# Patient Record
Sex: FEMALE | Race: WHITE | NOT HISPANIC OR LATINO | Employment: FULL TIME | URBAN - METROPOLITAN AREA
[De-identification: names, ages, dates, MRNs, and addresses within clinical notes are randomized per-mention and may not be internally consistent; named-entity substitution may affect disease eponyms.]

---

## 2018-01-09 NOTE — MISCELLANEOUS
Message   Recorded as Task   Date: 11/02/2016 01:41 PM, Created By: Merrianne Koyanagi   Task Name: Follow Up   Assigned To: Kimber Chery   Regarding Patient: Kobi Alvarez, Status: In Progress   Comment:    Mattie Bear - 02 Nov 2016 1:41 PM     TASK CREATED  L/m to return call  S/p C6-C7 ADAMS done 10/27/16 Mattie Pham - 02 Nov 2016 2:24 PM     TASK EDITED   Merrianne Koyanagi - 09 Nov 2016 1:59 PM     TASK EDITED     L/m to return call  S/p  C6-C7 ADAMS done 10/27/16 [facility] Mattie Pham - 10 Nov 2016 2:21 PM     TASK EDITED  3rd attempt  Mail letter  Letter Sent      Active Problems    1  Cervical disc disorder with radiculopathy of mid-cervical region (723 4) (M50 120)   2  Chronic neck pain (723 1,338 29) (M54 2,G89 29)   3  Chronic pain syndrome (338 4) (G89 4)   4  Lumbago (724 2) (M54 5)   5  Lumbar Strain (847 2)   6  Sacroiliitis (720 2) (M46 1)   7  Scapulothoracic Fibrositis On The Left (726 2)    Current Meds   1  Advil 200 MG Oral Capsule Recorded    Allergies    1   No Known Drug Allergies    Signatures   Electronically signed by : Kyla Mcardle, ; Nov 17 2016  9:43AM EST                       (Author)

## 2018-01-12 NOTE — MISCELLANEOUS
Signatures   Electronically signed by : IVETH Conn ; Nov 21 2016  8:19AM EST                       (Author)

## 2018-01-13 NOTE — MISCELLANEOUS
Message   Recorded as Task   Date: 09/29/2016 02:08 PM, Created By: Dahlia De La Garza   Task Name: Follow Up   Assigned To: [address] [facility], [address] Procedure,Team   Regarding Patient: Lorin Venegas, Status: In Progress   Comment:    Mattie Bear - 29 Sep 2016 2:08 PM     TASK CREATED  L/m to return call  S/p C6-C7 ADAMS done 9/21/16 by Mattie Panda - 29 Sep 2016 2:16 PM     TASK EDITED   Mattie Bear - 76 Oct 2016 2:13 PM     TASK EDITED    Patient l/m reporting she received 25% relie from her procedure   Her pain level today is a 5  S/p  C6-C7 ADAMS done 9/21/16  Fausto Sherwood - 03 Oct 2016 2:35 PM     TASK REPLIED TO: Previously Assigned To Fausto Sherwood  Does she want to repeat it or come in for a follow up? Mattie Bear - 05 Oct 2016 1:28 PM     TASK EDITED    L/m to return call  Mattie Bear - 06 Oct 2016 11:32 AM     TASK EDITED  Please schedule repeat ADAMS, Thanks Shelley Whittington - 17 Oct 2016 11:15 AM     TASK REPLIED TO: Previously Assigned To Blair Nixon  called pt to try to schedule the procedure and had to leave a message for her to call back  Thank you        Active Problems    1  Cervical disc disorder with radiculopathy of mid-cervical region (723 4) (M50 120)   2  Chronic neck pain (723 1,338 29) (M54 2,G89 29)   3  Chronic pain syndrome (338 4) (G89 4)   4  Lumbago (724 2) (M54 5)   5  Lumbar Strain (847 2)   6  Sacroiliitis (720 2) (M46 1)   7  Scapulothoracic Fibrositis On The Left (726 2)    Current Meds   1  Advil 200 MG Oral Capsule Recorded    Allergies    1   No Known Drug Allergies    Signatures   Electronically signed by : Guillermo Maldonado MA; Oct 19 2016  2:59PM EST                       (Author)

## 2020-01-03 ENCOUNTER — TELEPHONE (OUTPATIENT)
Dept: OBGYN CLINIC | Facility: CLINIC | Age: 45
End: 2020-01-03

## 2020-01-03 ENCOUNTER — OFFICE VISIT (OUTPATIENT)
Dept: OBGYN CLINIC | Facility: CLINIC | Age: 45
End: 2020-01-03
Payer: COMMERCIAL

## 2020-01-03 ENCOUNTER — APPOINTMENT (OUTPATIENT)
Dept: RADIOLOGY | Facility: CLINIC | Age: 45
End: 2020-01-03
Payer: COMMERCIAL

## 2020-01-03 VITALS
HEIGHT: 61 IN | DIASTOLIC BLOOD PRESSURE: 77 MMHG | WEIGHT: 135 LBS | BODY MASS INDEX: 25.49 KG/M2 | HEART RATE: 76 BPM | SYSTOLIC BLOOD PRESSURE: 119 MMHG

## 2020-01-03 DIAGNOSIS — M79.641 RIGHT HAND PAIN: ICD-10-CM

## 2020-01-03 DIAGNOSIS — IMO0002 STRAIN OF TENDON OF THUMB: Primary | ICD-10-CM

## 2020-01-03 PROCEDURE — 73130 X-RAY EXAM OF HAND: CPT

## 2020-01-03 PROCEDURE — 99203 OFFICE O/P NEW LOW 30 MIN: CPT | Performed by: ORTHOPAEDIC SURGERY

## 2020-01-03 RX ORDER — PREDNISONE 50 MG/1
50 TABLET ORAL DAILY
Qty: 5 TABLET | Refills: 0 | Status: SHIPPED | OUTPATIENT
Start: 2020-01-03

## 2020-01-03 NOTE — TELEPHONE ENCOUNTER
Patient called stating her work needs to know her lifting restrictions  Please advise so I can write her a work note  Fax# [phone] Att: Morgan Connor     I advised patient I would call her with the restrictions and advise when note is faxed

## 2020-01-03 NOTE — PROGRESS NOTES
Assessment/Plan:  1  Strain of tendon of thumb     2  Right hand pain  XR hand 3+ vw right       Scribe Attestation    I,:   Pau Rome MA am acting as a scribe while in the presence of the attending physician :        I,:   Juan Carlos Nguyen DO personally performed the services described in this documentation    as scribed in my presence :              I discussed with Keshav Caldwell that her signs and symptoms are consistent with an FPL tendon strain  She is tender to palpation over the FPL tendon  X-rays are negative for any fractures or dislocations  Patient was fitted and provided with a thumb spica brace that she was instructed to wear for the next 2 weeks  A prescription was provided for Prednisone for 5 days  She will follow up in 2 weeks for repeat evaluation  Subjective:   Lawson Brown is a 38 y o  female who presents to the office today for evaluation of right hand pain  Patient states she went to grab a [employer] can yesterday when she noted pain  Patient notes pain to the base of her thumb  She states this is increased with pinch and   She denies any triggering  She denies any numbness or tingling  She has been taking Advil OTC as needed for pain  Review of Systems   Constitutional: Negative for chills and fever  HENT: Negative for drooling and sneezing  Eyes: Negative for redness  Respiratory: Negative for cough and wheezing  Gastrointestinal: Negative for nausea and vomiting  Musculoskeletal: Negative for arthralgias, joint swelling and myalgias  Neurological: Negative for weakness and numbness  Psychiatric/Behavioral: Negative for behavioral problems  The patient is not nervous/anxious            Past Medical History:   Diagnosis Date    Pain in neck        Past Surgical History:   Procedure Laterality Date    EPIDURAL BLOCK INJECTION N/A 10/27/2016    Procedure: BLOCK / INJECTION EPIDURAL STEROID CERVICAL  C6-C7;  Surgeon: Doris Sanon MD;  Location: [place] [address] MAIN OR; Service:     EPIDURAL BLOCK INJECTION N/A 9/21/2016    Procedure: BLOCK / INJECTION EPIDURAL STEROID CERVICAL   C-6-7;  Surgeon: Gerardo Hilton MD;  Location: [facility] MAIN OR;  Service:        Family History   Problem Relation Age of Onset    No Known Problems Mother     No Known Problems Father        Social History     Occupational History    Not on file   Tobacco Use    Smoking status: Never Smoker    Smokeless tobacco: Never Used   Substance and Sexual Activity    Alcohol use: Yes     Comment: very rarely     Drug use: No    Sexual activity: Not on file         Current Outpatient Medications:     calcium-vitamin D 250-100 MG-UNIT per tablet, Take 1 tablet by mouth 2 (two) times a day, Disp: , Rfl:     Allergies   Allergen Reactions    Tramadol GI Intolerance       Objective:  Vitals:    01/03/20 1006   BP: 119/77   Pulse: 76       Ortho Exam     Right thumb    NTTP A1 pulley  No triggering  Mild TTP thumb CMC  +- grind  NTTP 1st dorsal compartment  NTTP FCR  TTP FPL tendon   NTTP scaphoid tubercle    Compartments soft  Brisk capillary refill  S/m intact median, radial, and ulnar nerve     Physical Exam   Constitutional: She is oriented to person, place, and time  She appears well-developed and well-nourished  HENT:   Head: Normocephalic and atraumatic  Eyes: Conjunctivae are normal  Right eye exhibits no discharge  Left eye exhibits no discharge  Neck: Normal range of motion  Neck supple  Cardiovascular: Normal rate and intact distal pulses  Pulmonary/Chest: Effort normal  No respiratory distress  Musculoskeletal:   As noted in HPI   Neurological: She is alert and oriented to person, place, and time  Skin: Skin is warm and dry  Psychiatric: She has a normal mood and affect   Her behavior is normal  Judgment and thought content normal        I have personally reviewed pertinent films in PACS and my interpretation is as follows:X-ray right hand performed in the office today demonstrates no osseous abnormalities

## 2022-04-25 ENCOUNTER — APPOINTMENT (OUTPATIENT)
Dept: RADIOLOGY | Facility: CLINIC | Age: 47
End: 2022-04-25
Payer: COMMERCIAL

## 2022-04-25 ENCOUNTER — OFFICE VISIT (OUTPATIENT)
Dept: OBGYN CLINIC | Facility: CLINIC | Age: 47
End: 2022-04-25
Payer: COMMERCIAL

## 2022-04-25 VITALS
WEIGHT: 140 LBS | BODY MASS INDEX: 26.43 KG/M2 | HEIGHT: 61 IN | SYSTOLIC BLOOD PRESSURE: 103 MMHG | HEART RATE: 72 BPM | DIASTOLIC BLOOD PRESSURE: 70 MMHG

## 2022-04-25 DIAGNOSIS — M54.2 CHRONIC NECK PAIN: ICD-10-CM

## 2022-04-25 DIAGNOSIS — G89.29 CHRONIC NECK PAIN: ICD-10-CM

## 2022-04-25 DIAGNOSIS — M62.838 TRAPEZIUS MUSCLE SPASM: Primary | ICD-10-CM

## 2022-04-25 DIAGNOSIS — M54.2 NECK PAIN: ICD-10-CM

## 2022-04-25 PROCEDURE — 72040 X-RAY EXAM NECK SPINE 2-3 VW: CPT

## 2022-04-25 PROCEDURE — 99214 OFFICE O/P EST MOD 30 MIN: CPT | Performed by: ORTHOPAEDIC SURGERY

## 2022-04-25 NOTE — PROGRESS NOTES
Assessment/Plan:  1  Trapezius muscle spasm  XR spine cervical 2 or 3 vw injury    Ambulatory referral to Physical Therapy   2  Chronic neck pain  Ambulatory referral to Physical Therapy       Kaitlin Pinto has chronic neck pain and her discomfort today seems to be muscular in nature  She does not have any obvious signs of radiculopathy on examination today  I recommended undergoing formal physical therapy at this time as opposed to returning to [facility] for another injection as this was not helpful in the past   I do think her scoliosis may be contributing to her posture which increases her ongoing cervical neck pain  She did agree that physical therapy sounded like a good treatment option for her at this time and will try this again  She will follow up with me after therapy if the pain persists or worsens  Subjective:   Almita Kumari is a 56 y o  female who presents to the office for evaluation for a long history of neck pain  She states she has history of chronic neck pain dating back to 6 years ago  At that time she had physical therapy and an MRI demonstrating a disc herniation  She underwent an epidural injection she states this injection did not help  She was recommended to see a surgeon at that time but did not want to pursue any surgical intervention  She states the pain did improve on her own over period of time but it seems to have increased over the last several months  She denies any new injury or trauma  She denies any new car accident or fall  She states she has aching throbbing pain in her neck cross her shoulders but she does not feel any numbness or tingling radiating down her hands  She does have a history of scoliosis that was diagnosed in teenage years  Right now she has been taking oral anti-inflammatories over-the-counter and tried icing her neck but no other treatment modalities have been tried        Review of Systems   Constitutional: Negative for chills, fever and unexpected weight change  HENT: Negative for hearing loss, nosebleeds and sore throat  Eyes: Negative for pain, redness and visual disturbance  Respiratory: Negative for cough, shortness of breath and wheezing  Cardiovascular: Negative for chest pain, palpitations and leg swelling  Gastrointestinal: Negative for abdominal pain, nausea and vomiting  Endocrine: Negative for polydipsia and polyuria  Genitourinary: Negative for dysuria and hematuria  Musculoskeletal:        See HPI   Skin: Negative for rash and wound  Neurological: Negative for dizziness, numbness and headaches  Psychiatric/Behavioral: Negative for decreased concentration and suicidal ideas  The patient is not nervous/anxious            Past Medical History:   Diagnosis Date    Pain in neck        Past Surgical History:   Procedure Laterality Date    EPIDURAL BLOCK INJECTION N/A 10/27/2016    Procedure: BLOCK / INJECTION EPIDURAL STEROID CERVICAL  C6-C7;  Surgeon: Gerardo Hilton MD;  Location: [facility] MAIN OR;  Service:     EPIDURAL BLOCK INJECTION N/A 9/21/2016    Procedure: BLOCK / INJECTION EPIDURAL STEROID CERVICAL   C-6-7;  Surgeon: Gerardo Hilton MD;  Location: [facility] MAIN OR;  Service:        Family History   Problem Relation Age of Onset    No Known Problems Mother     No Known Problems Father        Social History     Occupational History    Not on file   Tobacco Use    Smoking status: Never Smoker    Smokeless tobacco: Never Used   Vaping Use    Vaping Use: Never used   Substance and Sexual Activity    Alcohol use: Yes     Comment: very rarely     Drug use: No    Sexual activity: Not on file         Current Outpatient Medications:     calcium-vitamin D 250-100 MG-UNIT per tablet, Take 1 tablet by mouth 2 (two) times a day, Disp: , Rfl:     predniSONE 50 mg tablet, Take 1 tablet (50 mg total) by mouth daily, Disp: 5 tablet, Rfl: 0    Allergies   Allergen Reactions    Tramadol GI Intolerance Objective:  Vitals:    04/25/22 1309   BP: 103/70   Pulse: 72       Ortho Exam    Physical Exam  Vitals and nursing note reviewed  Constitutional:       Appearance: She is well-developed  HENT:      Head: Normocephalic and atraumatic  Eyes:      General: No scleral icterus  Conjunctiva/sclera: Conjunctivae normal    Neck:        Comments: Negative Spurling's maneuver bilateral    Normal strength and sensation bilateral extremities  Cardiovascular:      Rate and Rhythm: Normal rate  Pulmonary:      Effort: Pulmonary effort is normal  No respiratory distress  Musculoskeletal:      Cervical back: Neck supple  Muscular tenderness present  Decreased range of motion  Comments: As noted in HPI   Skin:     General: Skin is warm and dry  Neurological:      Mental Status: She is alert and oriented to person, place, and time  Psychiatric:         Behavior: Behavior normal          I have personally reviewed pertinent films in PACS and my interpretation is as follows: Three-view x-ray of the cervical spine demonstrates mild degenerative changes    Reversal of normal cervical lordosis

## 2022-05-04 ENCOUNTER — EVALUATION (OUTPATIENT)
Dept: PHYSICAL THERAPY | Facility: CLINIC | Age: 47
End: 2022-05-04

## 2022-05-04 DIAGNOSIS — M54.2 CHRONIC NECK PAIN: Primary | ICD-10-CM

## 2022-05-04 DIAGNOSIS — G89.29 CHRONIC NECK PAIN: Primary | ICD-10-CM

## 2022-05-04 DIAGNOSIS — M62.838 TRAPEZIUS MUSCLE SPASM: ICD-10-CM

## 2022-05-04 PROCEDURE — 97162 PT EVAL MOD COMPLEX 30 MIN: CPT

## 2022-05-04 NOTE — PROGRESS NOTES
PT Evaluation     Today's date: 2022  Patient name: Maxine Valle  : 1975  MRN: [id number]  Referring provider: Jolie Milner DO  Dx:   Encounter Diagnosis     ICD-10-CM    1  Chronic neck pain  M54 2 Ambulatory referral to Physical Therapy    G89 29    2  Trapezius muscle spasm  M62 838 Ambulatory referral to Physical Therapy       Start Time: 1145  Stop Time: 1230  Total time in clinic (min): 45 minutes    Assessment  Assessment details: Pt reports to the clinic with complaints of chronic neck pain and superior b/l shoulder pain for the past several months  Pt demonstrated decreased c/s AROM in all planes of motion with pain during right lateral flexion and right rotation  Pt felt slight TTP along upper traps but no pain on cervical musculature, which was hypertonic  Pt had slight hypomobility of the c/s, no pain during mobilizations  Special testing of the c/s was negative  B/l shoulder ROM and MMT were WNL  Pt's hx and clinical findings are consistent with the referring diagnosis of chronic neck pain with degenerative changes  Pt's pain and deficits are limiting her from performing self care, ADL's, and recreational activities to her fullest capability without compensations  Pt would benefit from skilled physical therapy to reduce her pain, address her deficits, progress towards her goals, and return to her PLOF  Impairments: abnormal muscle tone, abnormal or restricted ROM, activity intolerance, lacks appropriate home exercise program, pain with function, poor posture  and poor body mechanics    Symptom irritability: moderateUnderstanding of Dx/Px/POC: good   Prognosis: good    Goals  Short Term Goals:  1) Pt will initiate and progress her HEP in 3-4 weeks  2) Pt will improve c/s ROM by 50% without pain in 3-4 weeks  3) Pt will improve posture without pain in 3-4 weeks      Long Term Goals:  1) Pt will be able to sleep throughout the night, 6-8 hours, and wake up without pain in 6-8 weeks  2) Pt will be able to perform ADL's without pain in 6-8 weeks  3) Pt will be able to look over her shoulder while driving without pain in 6-8 weeks  4) Pt will be able to read a book for 30-45 mins without pain in 6-8 weeks  Plan  Patient would benefit from: skilled physical therapy and PT eval  Planned modality interventions: cryotherapy and thermotherapy: hydrocollator packs  Planned therapy interventions: ADL retraining, activity modification, joint mobilization, manual therapy, motor coordination training, neuromuscular re-education, patient education, postural training, body mechanics training, coordination, strengthening, stretching, therapeutic activities, therapeutic exercise, flexibility, functional ROM exercises, graded exercise and home exercise program  Frequency: 2x week  Duration in weeks: 8  Treatment plan discussed with: patient        Subjective Evaluation    History of Present Illness  Mechanism of injury: Pt was told she had C5-C6, C6-C7 disc degeneration, recently got x-rays with Dr Shruthi Ricketts  Pt also has scoliosis which may be affecting her  Pt has been getting pain across both of her shoulder, some days one is worse than the other  Most recent episode of neck pain since November, no reason why just got gradually worse  Pt denies numbness and tingling down her arms  Pain in the neck stays at the base, has some headaches  Pt went to the eye doctor because she thought her vision was as crisp  No previous injuries or surgeries to her neck, shoulders or back  Pt hasn't been working since COVID-19, was a   Pt was weedwacking and felt fine during, increased pain later  Morning is the worst  Pt uses 1 pillow, doesn't wake up from pain     Pain  Current pain ratin  At best pain ratin  At worst pain ratin  Location: C7 both sides  Quality: burning, tight, pulling and grinding  Relieving factors: medications and ice  Aggravating factors: lifting and overhead activity  Progression: worsening    Social Support  Steps to enter house: yes  Stairs in house: yes   Lives in: multiple-level home  Lives with: spouse    Employment status: not working  Hand dominance: right  Exercise history: walking, lawn maintenance      Diagnostic Tests  X-ray: abnormal  Patient Goals  Patient goal: Pt wants to get rid of the pain, perform ADL's without compensations or pain  Objective     Concurrent Complaints  Positive for headaches  Negative for night pain, disturbed sleep, dizziness, faints and nausea/motion sickness    Static Posture     Head  Forward  Shoulders  Asymmetric shoulders, elevated and rounded  Palpation   Left   Hypertonic in the cervical paraspinals and upper trapezius  Right   Hypertonic in the cervical paraspinals and upper trapezius  Active Range of Motion   Cervical/Thoracic Spine       Cervical    Flexion: 31 degrees   Extension: 41 degrees      Left lateral flexion: 30 degrees      Right lateral flexion: 30 degrees     with pain  Left rotation: 61 degrees  Right rotation: 61 degrees    with pain  Left Shoulder   Flexion: WFL  Abduction: WFL  External rotation BTH: WFL  Internal rotation BTB: WFL    Right Shoulder   Flexion: WFL  Abduction: WFL  Internal rotation BTB: WFL    Joint Play     Hypomobile: C1, C2, C3, C4, C5, C6, C7 and T1     Strength/Myotome Testing     Left Shoulder     Planes of Motion   Flexion: 4   Abduction: 4   External rotation at 0°: 4+   Internal rotation at 0°: 4+     Right Shoulder     Planes of Motion   Flexion: 4   Abduction: 4   External rotation at 0°: 4+   Internal rotation at 0°: 4+     Tests   Cervical   Negative vertical compression, cervical distraction, alar ligament test and Sharp-Slim test      Left   Negative cervical flexion-rotation test      Right   Negative cervical flexion-rotation test      Lumbar   Negative vertical compression  Neuro Exam:     Headaches   Patient reports headaches: Yes  Flowsheet Rows      Most Recent Value   PT/OT G-Codes    Current Score 63   Projected Score 70                Precautions: None  HEP: Access [facility] [id number]   IE: 5/4/22     Date     5/4/22   Visit Number     1 (IE)   Manuals                                        Neuro Re-Ed                                                                 Ther Ex                                                                        Ther Activity                        Gait Training                        Modalities

## 2022-05-11 ENCOUNTER — OFFICE VISIT (OUTPATIENT)
Dept: PHYSICAL THERAPY | Facility: CLINIC | Age: 47
End: 2022-05-11

## 2022-05-11 DIAGNOSIS — G89.29 CHRONIC NECK PAIN: Primary | ICD-10-CM

## 2022-05-11 DIAGNOSIS — M54.2 CHRONIC NECK PAIN: Primary | ICD-10-CM

## 2022-05-11 DIAGNOSIS — M62.838 TRAPEZIUS MUSCLE SPASM: ICD-10-CM

## 2022-05-11 PROCEDURE — 97112 NEUROMUSCULAR REEDUCATION: CPT

## 2022-05-11 PROCEDURE — 97110 THERAPEUTIC EXERCISES: CPT

## 2022-05-11 NOTE — PROGRESS NOTES
Daily Note     Today's date: 2022  Patient name: Almita Kumari  : 1975  MRN: [id number]  Referring provider: Christian Phillips DO  Dx:   Encounter Diagnosis     ICD-10-CM    1  Chronic neck pain  M54 2     G89 29    2  Trapezius muscle spasm  M62 838        Start Time: 1145  Stop Time: 1230  Total time in clinic (min): 45 minutes    Subjective: Pt reports that her neck and shoulders were sore after her IE  Pt states her HEP feels good while she is doing it, however sore afterward  Pt did a lot of weedwacking at home yesterday so her shoulder blades/R shoulder is bothering her  Pt states experiencing R sided sciatica symptoms today  Objective: See treatment diary below      Assessment: Tolerated treatment well  Patient exhibited good technique with therapeutic exercises and would benefit from continued PT  Pt's radicular symptoms were assessed using the Ryanne Method, pt responded well to lumbar extension in prone with completely abolished pain that lasted more than 20 mins  Pt was educated on the derangement with her spine & vertebral disc and the need to incorporate prone press-ups into her program  Pt tolerated all cervical exercises and was given more postural strengthening  Plan: Continue per plan of care  Progress treatment as tolerated            Precautions: None  HEP: Access [facility] [id number]   IE: 22     Date    22   Visit Number    2 1 (IE)   Manuals                                        Neuro Re-Ed         C/s retr    10x    C/s retr w/ext    15x    Rows & ext    Blue 15x ea    Supine serratus punch    2x10    scap retr                        Ther Ex        Prone press ups    10x    Doorway pec str        UT str                                                Ther Activity                        Gait Training                        Modalities

## 2022-05-18 ENCOUNTER — APPOINTMENT (OUTPATIENT)
Dept: PHYSICAL THERAPY | Facility: CLINIC | Age: 47
End: 2022-05-18

## 2022-05-25 ENCOUNTER — APPOINTMENT (OUTPATIENT)
Dept: PHYSICAL THERAPY | Facility: CLINIC | Age: 47
End: 2022-05-25

## 2022-07-13 NOTE — PROGRESS NOTES
Pt was seen for 2 visits of skilled physical therapy for chronic neck pain and radicular symptoms  Pt experienced progress after her 2nd session but did not return afterward  Pt has been discharged due to inactivity